# Patient Record
Sex: FEMALE | URBAN - METROPOLITAN AREA
[De-identification: names, ages, dates, MRNs, and addresses within clinical notes are randomized per-mention and may not be internally consistent; named-entity substitution may affect disease eponyms.]

---

## 2023-02-14 ENCOUNTER — APPOINTMENT (OUTPATIENT)
Dept: URBAN - METROPOLITAN AREA CLINIC 197 | Age: 63
Setting detail: DERMATOLOGY
End: 2023-02-16

## 2023-02-14 DIAGNOSIS — Z71.89 OTHER SPECIFIED COUNSELING: ICD-10-CM

## 2023-02-14 DIAGNOSIS — L98.8 OTHER SPECIFIED DISORDERS OF THE SKIN AND SUBCUTANEOUS TISSUE: ICD-10-CM

## 2023-02-14 DIAGNOSIS — L90.8 OTHER ATROPHIC DISORDERS OF SKIN: ICD-10-CM

## 2023-02-14 DIAGNOSIS — D18.0 HEMANGIOMA: ICD-10-CM

## 2023-02-14 DIAGNOSIS — D22 MELANOCYTIC NEVI: ICD-10-CM

## 2023-02-14 DIAGNOSIS — L57.0 ACTINIC KERATOSIS: ICD-10-CM

## 2023-02-14 DIAGNOSIS — R23.8 OTHER SKIN CHANGES: ICD-10-CM

## 2023-02-14 DIAGNOSIS — L82.1 OTHER SEBORRHEIC KERATOSIS: ICD-10-CM

## 2023-02-14 PROBLEM — D22.4 MELANOCYTIC NEVI OF SCALP AND NECK: Status: ACTIVE | Noted: 2023-02-14

## 2023-02-14 PROBLEM — D18.01 HEMANGIOMA OF SKIN AND SUBCUTANEOUS TISSUE: Status: ACTIVE | Noted: 2023-02-14

## 2023-02-14 PROCEDURE — OTHER COUNSELING: OTHER

## 2023-02-14 PROCEDURE — OTHER MIPS QUALITY: OTHER

## 2023-02-14 PROCEDURE — 17000 DESTRUCT PREMALG LESION: CPT

## 2023-02-14 PROCEDURE — OTHER LIQUID NITROGEN: OTHER

## 2023-02-14 PROCEDURE — 99213 OFFICE O/P EST LOW 20 MIN: CPT | Mod: 25

## 2023-02-14 PROCEDURE — OTHER OTHER: OTHER

## 2023-02-14 PROCEDURE — 17003 DESTRUCT PREMALG LES 2-14: CPT

## 2023-02-14 ASSESSMENT — LOCATION ZONE DERM
LOCATION ZONE: HAND
LOCATION ZONE: NOSE
LOCATION ZONE: FACE
LOCATION ZONE: NECK
LOCATION ZONE: TRUNK

## 2023-02-14 ASSESSMENT — LOCATION DETAILED DESCRIPTION DERM
LOCATION DETAILED: RIGHT RADIAL DORSAL HAND
LOCATION DETAILED: LEFT INFERIOR ANTERIOR NECK
LOCATION DETAILED: RIGHT INFERIOR NASAL CHEEK
LOCATION DETAILED: LEFT MEDIAL FOREHEAD
LOCATION DETAILED: LEFT INFERIOR MEDIAL FOREHEAD
LOCATION DETAILED: RIGHT MEDIAL FOREHEAD
LOCATION DETAILED: NASAL DORSUM
LOCATION DETAILED: LEFT LATERAL SUPERIOR CHEST
LOCATION DETAILED: LEFT LATERAL ABDOMEN
LOCATION DETAILED: NASAL ROOT
LOCATION DETAILED: LEFT RADIAL DORSAL HAND
LOCATION DETAILED: MIDDLE STERNUM

## 2023-02-14 ASSESSMENT — LOCATION SIMPLE DESCRIPTION DERM
LOCATION SIMPLE: CHEST
LOCATION SIMPLE: LEFT HAND
LOCATION SIMPLE: LEFT FOREHEAD
LOCATION SIMPLE: RIGHT CHEEK
LOCATION SIMPLE: NOSE
LOCATION SIMPLE: RIGHT FOREHEAD
LOCATION SIMPLE: RIGHT HAND
LOCATION SIMPLE: LEFT ANTERIOR NECK
LOCATION SIMPLE: ABDOMEN

## 2023-02-14 NOTE — PROCEDURE: MIPS QUALITY
Detail Level: Detailed
Quality 47: Advance Care Plan: Advance Care Planning discussed and documented; advance care plan or surrogate decision maker documented in the medical record.
Quality 431: Preventive Care And Screening: Unhealthy Alcohol Use - Screening: Patient not identified as an unhealthy alcohol user when screened for unhealthy alcohol use using a systematic screening method
Quality 226: Preventive Care And Screening: Tobacco Use: Screening And Cessation Intervention: Patient screened for tobacco use and is an ex/non-smoker
Quality 111:Pneumonia Vaccination Status For Older Adults: Pneumococcal vaccine (PPSV23) administered on or after patient’s 60th birthday and before the end of the measurement period

## 2023-02-14 NOTE — PROCEDURE: LIQUID NITROGEN
Show Applicator Variable?: Yes
Consent: The patient's consent was obtained including but not limited to risks of crusting, scabbing, blistering, scarring, darker or lighter pigmentary change, recurrence, incomplete removal and infection.
Render Note In Bullet Format When Appropriate: No
Detail Level: Detailed
Number Of Freeze-Thaw Cycles: 1 freeze-thaw cycle
Post-Care Instructions: I reviewed with the patient in detail post-care instructions. Patient is to wear sunprotection, and avoid picking at any of the treated lesions. Pt may apply Vaseline to crusted or scabbing areas.
Duration Of Freeze Thaw-Cycle (Seconds): 5

## 2023-02-14 NOTE — PROCEDURE: OTHER
Detail Level: Zone
Other (Free Text): After assessment, patient is a candidate for cosmetics treatment with Ollie Fonseca RN including neuromodulators, dermal fillers, chemical peels, dermaplaning, and/or manual extraction.
Render Risk Assessment In Note?: no
Note Text (......Xxx Chief Complaint.): This diagnosis correlates with the
Other (Free Text): After assessment, patient is a candidate for cosmetics treatment with Ollie Fonseca RN including neuromodulators, dermal fillers, chemical peels, dermaplaning, and/or manual extraction.
Other (Free Text): After assessment, patient is a candidate for cosmetics treatment with Ollie Fonseca RN including neuromodulators, dermal fillers, chemical peels, dermaplaning, and/or manual extraction.

## 2023-02-15 ENCOUNTER — APPOINTMENT (OUTPATIENT)
Dept: URBAN - METROPOLITAN AREA CLINIC 197 | Age: 63
Setting detail: DERMATOLOGY
End: 2023-02-16

## 2023-02-15 DIAGNOSIS — L98.8 OTHER SPECIFIED DISORDERS OF THE SKIN AND SUBCUTANEOUS TISSUE: ICD-10-CM

## 2023-02-15 DIAGNOSIS — L85.3 XEROSIS CUTIS: ICD-10-CM

## 2023-02-15 DIAGNOSIS — L90.8 OTHER ATROPHIC DISORDERS OF SKIN: ICD-10-CM

## 2023-02-15 PROCEDURE — OTHER JEUVEAU (U OR CC): OTHER

## 2023-02-15 PROCEDURE — OTHER RECOMMENDATIONS: OTHER

## 2023-02-15 PROCEDURE — OTHER COSMETIC CONSULTATION: FILLERS: OTHER

## 2023-02-15 PROCEDURE — OTHER COUNSELING: OTHER

## 2023-02-15 PROCEDURE — OTHER JEUVEAU (U OR CC) ADDITIVE: OTHER

## 2023-02-15 PROCEDURE — OTHER OTHER (COSMETIC): OTHER

## 2023-02-15 ASSESSMENT — LOCATION DETAILED DESCRIPTION DERM
LOCATION DETAILED: RIGHT FRONTALIS
LOCATION DETAILED: LEFT FRONTALIS
LOCATION DETAILED: LEFT CORRUGATOR SUPERCILIARIS
LOCATION DETAILED: LEFT MEDIAL INFERIOR EYELID
LOCATION DETAILED: RIGHT MEDIAL INFERIOR EYELID
LOCATION DETAILED: LEFT CENTRAL MANDIBULAR CHEEK
LOCATION DETAILED: RIGHT CORRUGATOR SUPERCILIARIS
LOCATION DETAILED: RIGHT MEDIAL MANDIBULAR CHEEK
LOCATION DETAILED: RIGHT CENTRAL MANDIBULAR CHEEK
LOCATION DETAILED: PROCERUS
LOCATION DETAILED: LEFT MEDIAL MANDIBULAR CHEEK

## 2023-02-15 ASSESSMENT — LOCATION SIMPLE DESCRIPTION DERM
LOCATION SIMPLE: RIGHT CHEEK
LOCATION SIMPLE: LEFT CORRUGATOR SUPERCILIARIS
LOCATION SIMPLE: LEFT FRONTALIS
LOCATION SIMPLE: RIGHT FRONTALIS
LOCATION SIMPLE: PROCERUS
LOCATION SIMPLE: RIGHT INFERIOR EYELID
LOCATION SIMPLE: LEFT CHEEK
LOCATION SIMPLE: LEFT INFERIOR EYELID
LOCATION SIMPLE: RIGHT CORRUGATOR SUPERCILIARIS

## 2023-02-15 ASSESSMENT — LOCATION ZONE DERM
LOCATION ZONE: FACE
LOCATION ZONE: EYELID

## 2023-05-11 ENCOUNTER — APPOINTMENT (OUTPATIENT)
Dept: URBAN - METROPOLITAN AREA CLINIC 197 | Age: 63
Setting detail: DERMATOLOGY
End: 2023-05-11

## 2023-05-11 DIAGNOSIS — L98.8 OTHER SPECIFIED DISORDERS OF THE SKIN AND SUBCUTANEOUS TISSUE: ICD-10-CM

## 2023-05-11 PROCEDURE — OTHER COUNSELING: OTHER

## 2023-05-11 PROCEDURE — OTHER JEUVEAU (U OR CC) ADDITIVE: OTHER

## 2023-05-11 PROCEDURE — OTHER JEUVEAU (U OR CC): OTHER

## 2023-05-11 ASSESSMENT — LOCATION DETAILED DESCRIPTION DERM
LOCATION DETAILED: LEFT CORRUGATOR SUPERCILIARIS
LOCATION DETAILED: RIGHT CORRUGATOR SUPERCILIARIS
LOCATION DETAILED: LEFT FRONTALIS
LOCATION DETAILED: RIGHT FRONTALIS
LOCATION DETAILED: PROCERUS

## 2023-05-11 ASSESSMENT — LOCATION ZONE DERM: LOCATION ZONE: FACE

## 2023-05-11 ASSESSMENT — LOCATION SIMPLE DESCRIPTION DERM
LOCATION SIMPLE: PROCERUS
LOCATION SIMPLE: LEFT FRONTALIS
LOCATION SIMPLE: RIGHT FRONTALIS
LOCATION SIMPLE: RIGHT CORRUGATOR SUPERCILIARIS
LOCATION SIMPLE: LEFT CORRUGATOR SUPERCILIARIS

## 2023-08-01 ENCOUNTER — APPOINTMENT (OUTPATIENT)
Dept: URBAN - METROPOLITAN AREA CLINIC 197 | Age: 63
Setting detail: DERMATOLOGY
End: 2023-08-08

## 2023-08-01 DIAGNOSIS — L85.3 XEROSIS CUTIS: ICD-10-CM

## 2023-08-01 DIAGNOSIS — L29.8 OTHER PRURITUS: ICD-10-CM

## 2023-08-01 DIAGNOSIS — L57.0 ACTINIC KERATOSIS: ICD-10-CM

## 2023-08-01 PROBLEM — K75.9 INFLAMMATORY LIVER DISEASE, UNSPECIFIED: Status: ACTIVE | Noted: 2023-08-01

## 2023-08-01 PROBLEM — E07.9 DISORDER OF THYROID, UNSPECIFIED: Status: ACTIVE | Noted: 2023-08-01

## 2023-08-01 PROBLEM — D51.9 VITAMIN B12 DEFICIENCY ANEMIA, UNSPECIFIED: Status: ACTIVE | Noted: 2023-08-01

## 2023-08-01 PROCEDURE — OTHER ORDER TESTS: OTHER

## 2023-08-01 PROCEDURE — OTHER COUNSELING: OTHER

## 2023-08-01 PROCEDURE — OTHER MIPS QUALITY: OTHER

## 2023-08-01 PROCEDURE — OTHER LIQUID NITROGEN: OTHER

## 2023-08-01 PROCEDURE — 99214 OFFICE O/P EST MOD 30 MIN: CPT | Mod: 25

## 2023-08-01 PROCEDURE — 17003 DESTRUCT PREMALG LES 2-14: CPT

## 2023-08-01 PROCEDURE — OTHER PRESCRIPTION MEDICATION MANAGEMENT: OTHER

## 2023-08-01 PROCEDURE — 17000 DESTRUCT PREMALG LESION: CPT

## 2023-08-01 PROCEDURE — OTHER MEDICATION COUNSELING: OTHER

## 2023-08-01 PROCEDURE — OTHER PRESCRIPTION: OTHER

## 2023-08-01 RX ORDER — TRIAMCINOLONE ACETONIDE 1 MG/G
CREAM TOPICAL BID
Qty: 453.6 | Refills: 0 | Status: ERX | COMMUNITY
Start: 2023-08-01

## 2023-08-01 ASSESSMENT — LOCATION DETAILED DESCRIPTION DERM
LOCATION DETAILED: RIGHT DISTAL POSTERIOR UPPER ARM
LOCATION DETAILED: RIGHT PROXIMAL DORSAL FOREARM
LOCATION DETAILED: LEFT PROXIMAL POSTERIOR UPPER ARM
LOCATION DETAILED: RIGHT PROXIMAL POSTERIOR UPPER ARM
LOCATION DETAILED: UPPER STERNUM
LOCATION DETAILED: LEFT PROXIMAL DORSAL FOREARM
LOCATION DETAILED: LEFT INFERIOR LATERAL FOREHEAD
LOCATION DETAILED: LEFT MID TEMPLE

## 2023-08-01 ASSESSMENT — LOCATION SIMPLE DESCRIPTION DERM
LOCATION SIMPLE: CHEST
LOCATION SIMPLE: LEFT FOREARM
LOCATION SIMPLE: LEFT POSTERIOR UPPER ARM
LOCATION SIMPLE: LEFT FOREHEAD
LOCATION SIMPLE: RIGHT POSTERIOR UPPER ARM
LOCATION SIMPLE: RIGHT FOREARM
LOCATION SIMPLE: LEFT TEMPLE

## 2023-08-01 ASSESSMENT — LOCATION ZONE DERM
LOCATION ZONE: ARM
LOCATION ZONE: TRUNK
LOCATION ZONE: FACE

## 2023-08-01 NOTE — PROCEDURE: MEDICATION COUNSELING
Spoke with Ms. Jose Hunter, informed her of Rx being sent to pharmacy. Cimzia Counseling:  I discussed with the patient the risks of Cimzia including but not limited to immunosuppression, allergic reactions and infections.  The patient understands that monitoring is required including a PPD at baseline and must alert us or the primary physician if symptoms of infection or other concerning signs are noted.

## 2023-08-01 NOTE — PROCEDURE: PRESCRIPTION MEDICATION MANAGEMENT
Render In Strict Bullet Format?: No
Initiate Treatment: Robathol bath oil daily
Detail Level: Zone
Initiate Treatment: Zyrtec 10mg QID prn itch\\nTMC 0.1% bid

## 2023-08-01 NOTE — PROCEDURE: MEDICATION COUNSELING
175.26 Odomzo Counseling- I discussed with the patient the risks of Odomzo including but not limited to nausea, vomiting, diarrhea, constipation, weight loss, changes in the sense of taste, decreased appetite, muscle spasms, and hair loss.  The patient verbalized understanding of the proper use and possible adverse effects of Odomzo.  All of the patient's questions and concerns were addressed.

## 2023-08-01 NOTE — PROCEDURE: LIQUID NITROGEN
Render Post-Care Instructions In Note?: no
Number Of Freeze-Thaw Cycles: 1 freeze-thaw cycle
Detail Level: Detailed
Show Applicator Variable?: Yes
Consent: The patient's consent was obtained including but not limited to risks of crusting, scabbing, blistering, scarring, darker or lighter pigmentary change, recurrence, incomplete removal and infection.
Post-Care Instructions: I reviewed with the patient in detail post-care instructions. Patient is to wear sunprotection, and avoid picking at any of the treated lesions. Pt may apply Vaseline to crusted or scabbing areas.
Duration Of Freeze Thaw-Cycle (Seconds): 5

## 2023-08-01 NOTE — HPI: OTHER
Condition:: Rash on neck  and arms x months
Please Describe Your Condition:: Tx calamine Benadryl A&D ointment
Condition:: Spots on face
Please Describe Your Condition:: X weeks tx none

## 2023-08-08 ENCOUNTER — APPOINTMENT (OUTPATIENT)
Dept: URBAN - METROPOLITAN AREA CLINIC 197 | Age: 63
Setting detail: DERMATOLOGY
End: 2023-08-08

## 2023-08-08 DIAGNOSIS — L85.3 XEROSIS CUTIS: ICD-10-CM

## 2023-08-08 DIAGNOSIS — L57.0 ACTINIC KERATOSIS: ICD-10-CM

## 2023-08-08 PROCEDURE — OTHER PHOTO-DOCUMENTATION: OTHER

## 2023-08-08 PROCEDURE — 99213 OFFICE O/P EST LOW 20 MIN: CPT | Mod: 24

## 2023-08-08 PROCEDURE — OTHER COUNSELING: OTHER

## 2023-08-08 PROCEDURE — OTHER PRESCRIPTION MEDICATION MANAGEMENT: OTHER

## 2023-08-08 PROCEDURE — OTHER DEFER: OTHER

## 2023-08-08 PROCEDURE — OTHER MIPS QUALITY: OTHER

## 2023-08-08 PROCEDURE — OTHER MEDICATION COUNSELING: OTHER

## 2023-08-08 PROCEDURE — OTHER LAB REPORTS REVIEWED: OTHER

## 2023-08-08 ASSESSMENT — LOCATION SIMPLE DESCRIPTION DERM
LOCATION SIMPLE: RIGHT FOREARM
LOCATION SIMPLE: LEFT FOREARM
LOCATION SIMPLE: CHEST
LOCATION SIMPLE: LEFT POSTERIOR UPPER ARM
LOCATION SIMPLE: RIGHT POSTERIOR UPPER ARM

## 2023-08-08 ASSESSMENT — LOCATION ZONE DERM
LOCATION ZONE: ARM
LOCATION ZONE: TRUNK

## 2023-08-08 ASSESSMENT — LOCATION DETAILED DESCRIPTION DERM
LOCATION DETAILED: LEFT PROXIMAL DORSAL FOREARM
LOCATION DETAILED: LEFT MEDIAL SUPERIOR CHEST
LOCATION DETAILED: RIGHT PROXIMAL DORSAL FOREARM
LOCATION DETAILED: RIGHT DISTAL POSTERIOR UPPER ARM
LOCATION DETAILED: LEFT PROXIMAL POSTERIOR UPPER ARM
LOCATION DETAILED: UPPER STERNUM

## 2023-08-08 NOTE — PROCEDURE: MEDICATION COUNSELING
Clindamycin Pregnancy And Lactation Text: This medication can be used in pregnancy if certain situations. Clindamycin is also present in breast milk. Niacinamide Counseling: I recommended taking niacin or niacinamide, also know as vitamin B3, twice daily. Recent evidence suggests that taking vitamin B3 (500 mg twice daily) can reduce the risk of actinic keratoses and non-melanoma skin cancers. Side effects of vitamin B3 include flushing and headache.

## 2023-08-08 NOTE — PROCEDURE: DEFER
Introduction Text (Please End With A Colon): The following procedure was deferred:
Size Of Lesion In Cm (Optional): 0
Instructions (Optional): Pt will return after her trip to have treated with cryo.
Detail Level: Detailed

## 2023-08-08 NOTE — PROCEDURE: MEDICATION COUNSELING
Patient states about 20 min. Ago he ate a chocolate chip cookie and it must have been touching a cookie with peanut butter or peanuts in it and he is allergic to peanuts.  Patient states he used someone's epi pen.  Patient states he is feeling better now.    Valtrex Counseling: I discussed with the patient the risks of valacyclovir including but not limited to kidney damage, nausea, vomiting and severe allergy.  The patient understands that if the infection seems to be worsening or is not improving, they are to call.

## 2023-08-08 NOTE — PROCEDURE: PRESCRIPTION MEDICATION MANAGEMENT
Continue Regimen: Zyrtec 10mg QID prn itch\\nTMC 0.1% bid prn
Render In Strict Bullet Format?: No
Detail Level: Zone

## 2023-08-08 NOTE — PROCEDURE: LAB REPORTS REVIEWED
Labs Reviewed Override: Pruritis work up
Detail Level: Zone
Summarized Lab Results: Creatinine elevated, sed rate elevated, white blood cells elevated, GFR low, calcium elevated. She follows closely with Dr. Anthony and Dr. Mora and they are f/u on her labs. \\nPt given a copy of her lab results

## 2023-08-14 ENCOUNTER — APPOINTMENT (OUTPATIENT)
Dept: URBAN - METROPOLITAN AREA CLINIC 197 | Age: 63
Setting detail: DERMATOLOGY
End: 2023-08-15

## 2023-08-14 ENCOUNTER — APPOINTMENT (OUTPATIENT)
Dept: URBAN - METROPOLITAN AREA CLINIC 197 | Age: 63
Setting detail: DERMATOLOGY
End: 2023-08-29

## 2023-08-14 DIAGNOSIS — Z41.9 ENCOUNTER FOR PROCEDURE FOR PURPOSES OTHER THAN REMEDYING HEALTH STATE, UNSPECIFIED: ICD-10-CM

## 2023-08-14 DIAGNOSIS — L57.0 ACTINIC KERATOSIS: ICD-10-CM

## 2023-08-14 PROCEDURE — OTHER JEUVEAU (U OR CC): OTHER

## 2023-08-14 PROCEDURE — 17003 DESTRUCT PREMALG LES 2-14: CPT

## 2023-08-14 PROCEDURE — OTHER COUNSELING: OTHER

## 2023-08-14 PROCEDURE — OTHER LIQUID NITROGEN: OTHER

## 2023-08-14 PROCEDURE — 17000 DESTRUCT PREMALG LESION: CPT

## 2023-08-14 PROCEDURE — OTHER OTHER (COSMETIC): OTHER

## 2023-08-14 ASSESSMENT — LOCATION SIMPLE DESCRIPTION DERM
LOCATION SIMPLE: CHEST
LOCATION SIMPLE: LEFT ANTERIOR NECK
LOCATION SIMPLE: RIGHT HAND

## 2023-08-14 ASSESSMENT — LOCATION ZONE DERM
LOCATION ZONE: NECK
LOCATION ZONE: TRUNK
LOCATION ZONE: HAND

## 2023-08-14 ASSESSMENT — LOCATION DETAILED DESCRIPTION DERM
LOCATION DETAILED: RIGHT RADIAL DORSAL HAND
LOCATION DETAILED: RIGHT DORSAL MIDDLE METACARPOPHALANGEAL JOINT
LOCATION DETAILED: RIGHT MEDIAL SUPERIOR CHEST
LOCATION DETAILED: LEFT MEDIAL SUPERIOR CHEST
LOCATION DETAILED: LEFT INFERIOR LATERAL NECK

## 2023-08-14 NOTE — PROCEDURE: JEUVEAU (U OR CC)
Post-Care Instructions: -Avoid touching or physical pressure on the treated area.\\n-Expect swelling and bruising. Apply a pack of ice for relief using light pressure only.\\n-For the first four hours, do not lay down.\\n-Do not drink alcohol or use NSAIDs for 24 hours following your treatment to reduce risk of additional bruising.\\n-Avoid sun exposure, tanning beds, saunas, and hot tubs for 24 hours.\\n-Avoid workouts or any activities raising the blood pressure or hard rate for the first 24 hours.\\n- Neuromodulators can take 3-5 days to take affect and up to 2 weeks to be at its \"final\" stage. RD the 14th day. Encouraged to schedule two week appointment for any refinements.\\n\\nContact the office for concerns. Reviewed for emergencies, go to your local emergency department.\\n\\nPaper post care instructions were given to patient prior to leaving office.\\n\\nPatient tolerated procedure well with no immediate adverse effects. Patient left the office AOx3.\\n\\nEncouraged to contact the office c any questions. [Negative] : Endocrine [TextBox_30] : HPI [TextBox_44] : [place]STON, MINNA with PTCI stent [TextBox_69] : Right inguinal Hernia [TextBox_110] : PE/ DVT

## 2023-08-14 NOTE — PROCEDURE: JEUVEAU (U OR CC)
Price (Use Numbers Only, No Special Characters Or $): 630 Price (Use Numbers Only, No Special Characters Or $): 663

## 2023-08-14 NOTE — PROCEDURE: OTHER (COSMETIC)
Detail Level: Zone
Other (Free Text): She states that she hates the procedure crease. Will add2\\n 2 units here to try and immobilize and allow static rhytid healing and lightening

## 2024-03-13 ENCOUNTER — APPOINTMENT (OUTPATIENT)
Dept: URBAN - METROPOLITAN AREA CLINIC 197 | Age: 64
Setting detail: DERMATOLOGY
End: 2024-03-14

## 2024-03-13 ENCOUNTER — APPOINTMENT (OUTPATIENT)
Dept: URBAN - METROPOLITAN AREA CLINIC 197 | Age: 64
Setting detail: DERMATOLOGY
End: 2024-04-02

## 2024-03-13 DIAGNOSIS — D485 NEOPLASM OF UNCERTAIN BEHAVIOR OF SKIN: ICD-10-CM

## 2024-03-13 DIAGNOSIS — L57.0 ACTINIC KERATOSIS: ICD-10-CM

## 2024-03-13 DIAGNOSIS — Z41.9 ENCOUNTER FOR PROCEDURE FOR PURPOSES OTHER THAN REMEDYING HEALTH STATE, UNSPECIFIED: ICD-10-CM

## 2024-03-13 PROBLEM — D48.5 NEOPLASM OF UNCERTAIN BEHAVIOR OF SKIN: Status: ACTIVE | Noted: 2024-03-13

## 2024-03-13 PROCEDURE — 11102 TANGNTL BX SKIN SINGLE LES: CPT

## 2024-03-13 PROCEDURE — OTHER COUNSELING: OTHER

## 2024-03-13 PROCEDURE — OTHER LIQUID NITROGEN: OTHER

## 2024-03-13 PROCEDURE — OTHER JEUVEAU (U OR CC): OTHER

## 2024-03-13 PROCEDURE — 17000 DESTRUCT PREMALG LESION: CPT | Mod: 59

## 2024-03-13 PROCEDURE — 17003 DESTRUCT PREMALG LES 2-14: CPT

## 2024-03-13 PROCEDURE — OTHER DEFER: OTHER

## 2024-03-13 PROCEDURE — OTHER MIPS QUALITY: OTHER

## 2024-03-13 PROCEDURE — OTHER OTHER (COSMETIC): OTHER

## 2024-03-13 PROCEDURE — OTHER BIOPSY BY SHAVE METHOD: OTHER

## 2024-03-13 ASSESSMENT — LOCATION SIMPLE DESCRIPTION DERM
LOCATION SIMPLE: CHEST
LOCATION SIMPLE: RIGHT CHEEK
LOCATION SIMPLE: RIGHT HAND
LOCATION SIMPLE: RIGHT WRIST
LOCATION SIMPLE: RIGHT FOREARM
LOCATION SIMPLE: LEFT FOREHEAD
LOCATION SIMPLE: LEFT EYEBROW

## 2024-03-13 ASSESSMENT — LOCATION ZONE DERM
LOCATION ZONE: ARM
LOCATION ZONE: FACE
LOCATION ZONE: HAND
LOCATION ZONE: TRUNK

## 2024-03-13 ASSESSMENT — LOCATION DETAILED DESCRIPTION DERM
LOCATION DETAILED: RIGHT RADIAL DORSAL HAND
LOCATION DETAILED: RIGHT DISTAL DORSAL FOREARM
LOCATION DETAILED: RIGHT LATERAL MALAR CHEEK
LOCATION DETAILED: RIGHT ULNAR DORSAL HAND
LOCATION DETAILED: MIDDLE STERNUM
LOCATION DETAILED: LEFT MEDIAL SUPERIOR CHEST
LOCATION DETAILED: LEFT LATERAL EYEBROW
LOCATION DETAILED: LEFT INFERIOR FOREHEAD
LOCATION DETAILED: RIGHT LATERAL DORSAL WRIST

## 2024-03-13 NOTE — PROCEDURE: COUNSELING
Miralax daily and again at night if needed.    Please schedule for egd  barretts  
Detail Level: Zone
Detail Level: Simple

## 2024-03-13 NOTE — PROCEDURE: JEUVEAU (U OR CC)
Additional Area 4 Units: 0
Price (Use Numbers Only, No Special Characters Or $): 091
Post-Care Instructions: -Avoid touching or physical pressure on the treated area.\\n-Expect swelling and bruising. Apply a pack of ice for relief using light pressure only.\\n-For the first four hours, do not lay down.\\n-Do not drink alcohol or use NSAIDs for 24 hours following your treatment to reduce risk of additional bruising.\\n-Avoid sun exposure, tanning beds, saunas, and hot tubs for 24 hours.\\n-Avoid workouts or any activities raising the blood pressure or hard rate for the first 24 hours.\\n- Neuromodulators can take 3-5 days to take affect and up to 2 weeks to be at its \"final\" stage. RD the 14th day. Encouraged to schedule two week appointment for any refinements.\\n\\nContact the office for concerns. Reviewed for emergencies, go to your local emergency department.\\n\\nPaper post care instructions were given to patient prior to leaving office.\\n\\nPatient tolerated procedure well with no immediate adverse effects. Patient left the office AOx3.\\n\\nEncouraged to contact the office c any questions.
Document As Units Or Cc?: units
Detail Level: Detailed
Lot #: [id number]
Expiration Date (Month Year): 11/2025
Glabellar Complex Units: 20
Consent: Written consent obtained. Risks include but not limited hypersensitivity reaction, lid/brow ptosis, bruising, swelling, diplopia, temporary effect, incomplete chemical denervation.\\n\\nBotulinum toxin and neuromodulator/neurotoxin treatment were discussed in detail with the patient. Including risks, benefits, expected outcomes, MOA, and overall patient goals. Pt verbalized understanding throughout the discussion and procedure and was encouraged to ask any questions.\\n\\n-\\nProcedure Details:\\n\\nPlease see attached map for locations, injection amounts, and treatment notes/memorandums.\\n\\nArea was prepared with 70% isopropyl alcohol and hypochlorous acid (Puracyn). \\n\\nScanned medical history, medication reconciliation, know allergies, and past treatments (if applicable) as well as photo consent were reviewed with patient prior to procedure.\\n\\nPt advised that should photo consent for marketing/publication be declined, photos will still be obtained a securely stored in their EHR records.
Including Pricing Information In The Note: No
Dilution (U/0.1 Cc): 4
Forehead Units/Cc: 10

## 2024-03-13 NOTE — PROCEDURE: DEFER
Detail Level: Zone
Instructions (Optional): Pt does not wish to have these areas tx at this time
Size Of Lesion In Cm (Optional): 0
Introduction Text (Please End With A Colon): The following procedure was deferred:

## 2024-03-13 NOTE — PROCEDURE: BIOPSY BY SHAVE METHOD
Detail Level: Detailed
Depth Of Biopsy: dermis
Was A Bandage Applied: Yes
Size Of Lesion In Cm: 0
Biopsy Type: H and E
Biopsy Method: 10 blade
Anesthesia Type: 1% lidocaine with epinephrine
Anesthesia Volume In Cc: 0.5
Hemostasis: Electrocautery
Wound Care: No ointment
Dressing: Band-Aid
Destruction After The Procedure: No
Type Of Destruction Used: Curettage
Curettage Text: The wound bed was treated with curettage after the biopsy was performed.
Cryotherapy Text: The wound bed was treated with cryotherapy after the biopsy was performed.
Electrodesiccation Text: The wound bed was treated with electrodesiccation after the biopsy was performed.
Electrodesiccation And Curettage Text: The wound bed was treated with electrodesiccation and curettage after the biopsy was performed.
Silver Nitrate Text: The wound bed was treated with silver nitrate after the biopsy was performed.
Lab: -[id number]
Consent: Verbal consent was obtained and risks were reviewed including but not limited to scarring, infection, bleeding, scabbing, incomplete removal, nerve damage and allergy to anesthesia.
Post-Care Instructions: I reviewed with the patient in detail post-care instructions. Patient is to keep the biopsy site dry overnight, and then apply vaseline if needed daily until healed.
Notification Instructions: Patient will be notified of biopsy results. However, patient instructed to call the office if not contacted within 2 weeks.
Billing Type: Third-Party Bill
Information: Selecting Yes will display possible errors in your note based on the variables you have selected. This validation is only offered as a suggestion for you. PLEASE NOTE THAT THE VALIDATION TEXT WILL BE REMOVED WHEN YOU FINALIZE YOUR NOTE. IF YOU WANT TO FAX A PRELIMINARY NOTE YOU WILL NEED TO TOGGLE THIS TO 'NO' IF YOU DO NOT WANT IT IN YOUR FAXED NOTE.

## 2024-03-13 NOTE — PROCEDURE: LIQUID NITROGEN
Number Of Freeze-Thaw Cycles: 1 freeze-thaw cycle
Render Post-Care Instructions In Note?: no
Consent: The patient's consent was obtained including but not limited to risks of crusting, scabbing, blistering, scarring, darker or lighter pigmentary change, recurrence, incomplete removal and infection.
Show Aperture Variable?: Yes
Detail Level: Zone
Post-Care Instructions: I reviewed with the patient in detail post-care instructions. Patient is to wear sunprotection, and avoid picking at any of the treated lesions. Pt may apply Vaseline to crusted or scabbing areas.
Duration Of Freeze Thaw-Cycle (Seconds): 5

## 2024-04-17 ENCOUNTER — APPOINTMENT (OUTPATIENT)
Dept: URBAN - METROPOLITAN AREA CLINIC 197 | Age: 64
Setting detail: DERMATOLOGY
End: 2024-04-18

## 2024-04-17 DIAGNOSIS — D69.2 OTHER NONTHROMBOCYTOPENIC PURPURA: ICD-10-CM

## 2024-04-17 PROBLEM — D04.61 CARCINOMA IN SITU OF SKIN OF RIGHT UPPER LIMB, INCLUDING SHOULDER: Status: ACTIVE | Noted: 2024-04-17

## 2024-04-17 PROCEDURE — 17271 DSTR MAL LES S/N/H/F/G 0.6-1: CPT

## 2024-04-17 PROCEDURE — OTHER REASSURANCE: OTHER

## 2024-04-17 PROCEDURE — OTHER COUNSELING: OTHER

## 2024-04-17 PROCEDURE — OTHER PATHOLOGY DISCUSSION: OTHER

## 2024-04-17 PROCEDURE — OTHER MIPS QUALITY: OTHER

## 2024-04-17 PROCEDURE — OTHER CURETTAGE AND DESTRUCTION: OTHER

## 2024-04-17 PROCEDURE — 99212 OFFICE O/P EST SF 10 MIN: CPT | Mod: 25

## 2024-04-17 ASSESSMENT — LOCATION ZONE DERM: LOCATION ZONE: ARM

## 2024-04-17 ASSESSMENT — LOCATION SIMPLE DESCRIPTION DERM: LOCATION SIMPLE: LEFT FOREARM

## 2024-04-17 ASSESSMENT — LOCATION DETAILED DESCRIPTION DERM: LOCATION DETAILED: LEFT DISTAL DORSAL FOREARM

## 2024-04-17 NOTE — PROCEDURE: CURETTAGE AND DESTRUCTION

## 2024-08-07 ENCOUNTER — APPOINTMENT (OUTPATIENT)
Dept: URBAN - METROPOLITAN AREA CLINIC 197 | Age: 64
Setting detail: DERMATOLOGY
End: 2024-08-07

## 2024-08-07 DIAGNOSIS — Z41.9 ENCOUNTER FOR PROCEDURE FOR PURPOSES OTHER THAN REMEDYING HEALTH STATE, UNSPECIFIED: ICD-10-CM

## 2024-08-07 PROCEDURE — OTHER JEUVEAU (U OR CC): OTHER

## 2024-08-07 PROCEDURE — OTHER OTHER (COSMETIC): OTHER

## 2024-08-07 PROCEDURE — OTHER COUNSELING: OTHER

## 2024-08-07 NOTE — PROCEDURE: JEUVEAU (U OR CC)
Glabellar Complex Units: 6
Dilution (U/0.1 Cc): 4
Levator Labii Superioris Units: 0
Additional Area 1 Location: corrugators
Consent: Written consent obtained. Risks include but not limited hypersensitivity reaction, lid/brow ptosis, bruising, swelling, diplopia, temporary effect, incomplete chemical denervation.\\n\\nBotulinum toxin and neuromodulator/neurotoxin treatment were discussed in detail with the patient. Including risks, benefits, expected outcomes, MOA, and overall patient goals. Pt verbalized understanding throughout the discussion and procedure and was encouraged to ask any questions.\\n\\n-\\nProcedure Details:\\n\\nPlease see attached map for locations, injection amounts, and treatment notes/memorandums.\\n\\nArea was prepared with 70% isopropyl alcohol and hypochlorous acid (Puracyn). \\n\\nScanned medical history, medication reconciliation, know allergies, and past treatments (if applicable) as well as photo consent were reviewed with patient prior to procedure.\\n\\nPt advised that should photo consent for marketing/publication be declined, photos will still be obtained a securely stored in their EHR records.
Including Pricing Information In The Note: No
Post-Care Instructions: -Avoid touching or physical pressure on the treated area.\\n-Expect swelling and bruising. Apply a pack of ice for relief using light pressure only.\\n-For the first four hours, do not lay down.\\n-Do not drink alcohol or use NSAIDs for 24 hours following your treatment to reduce risk of additional bruising.\\n-Avoid sun exposure, tanning beds, saunas, and hot tubs for 24 hours.\\n-Avoid workouts or any activities raising the blood pressure or hard rate for the first 24 hours.\\n- Neuromodulators can take 3-5 days to take affect and up to 2 weeks to be at its \"final\" stage. RD the 14th day. Encouraged to schedule two week appointment for any refinements.\\n\\nContact the office for concerns. Reviewed for emergencies, go to your local emergency department.\\n\\nPaper post care instructions were given to patient prior to leaving office.\\n\\nPatient tolerated procedure well with no immediate adverse effects. Patient left the office AOx3.\\n\\nEncouraged to contact the office c any questions.
Detail Level: Detailed
Expiration Date (Month Year): 09/2025
Forehead Units/Cc: 10
Price (Use Numbers Only, No Special Characters Or $): 320
Additional Area 1 Units: 14
Document As Units Or Cc?: units
Lot #: [id number]

## 2024-12-11 ENCOUNTER — APPOINTMENT (OUTPATIENT)
Dept: URBAN - METROPOLITAN AREA CLINIC 197 | Age: 64
Setting detail: DERMATOLOGY
End: 2024-12-13

## 2024-12-11 ENCOUNTER — APPOINTMENT (OUTPATIENT)
Dept: URBAN - METROPOLITAN AREA CLINIC 197 | Age: 64
Setting detail: DERMATOLOGY
End: 2024-12-17

## 2024-12-11 DIAGNOSIS — I78.8 OTHER DISEASES OF CAPILLARIES: ICD-10-CM

## 2024-12-11 DIAGNOSIS — L57.0 ACTINIC KERATOSIS: ICD-10-CM

## 2024-12-11 DIAGNOSIS — Z41.9 ENCOUNTER FOR PROCEDURE FOR PURPOSES OTHER THAN REMEDYING HEALTH STATE, UNSPECIFIED: ICD-10-CM

## 2024-12-11 DIAGNOSIS — L98.8 OTHER SPECIFIED DISORDERS OF THE SKIN AND SUBCUTANEOUS TISSUE: ICD-10-CM

## 2024-12-11 DIAGNOSIS — D485 NEOPLASM OF UNCERTAIN BEHAVIOR OF SKIN: ICD-10-CM

## 2024-12-11 PROBLEM — D48.5 NEOPLASM OF UNCERTAIN BEHAVIOR OF SKIN: Status: ACTIVE | Noted: 2024-12-11

## 2024-12-11 PROCEDURE — 17003 DESTRUCT PREMALG LES 2-14: CPT

## 2024-12-11 PROCEDURE — OTHER JEUVEAU (U OR CC): OTHER

## 2024-12-11 PROCEDURE — OTHER OTHER (COSMETIC): OTHER

## 2024-12-11 PROCEDURE — 11102 TANGNTL BX SKIN SINGLE LES: CPT

## 2024-12-11 PROCEDURE — 17000 DESTRUCT PREMALG LESION: CPT | Mod: 59

## 2024-12-11 PROCEDURE — 99212 OFFICE O/P EST SF 10 MIN: CPT | Mod: 25

## 2024-12-11 PROCEDURE — OTHER LIQUID NITROGEN: OTHER

## 2024-12-11 PROCEDURE — OTHER OTHER: OTHER

## 2024-12-11 PROCEDURE — OTHER MIPS QUALITY: OTHER

## 2024-12-11 PROCEDURE — OTHER COUNSELING: OTHER

## 2024-12-11 PROCEDURE — OTHER BIOPSY BY SHAVE METHOD: OTHER

## 2024-12-11 PROCEDURE — OTHER DEFER: OTHER

## 2024-12-11 ASSESSMENT — LOCATION SIMPLE DESCRIPTION DERM
LOCATION SIMPLE: RIGHT TEMPLE
LOCATION SIMPLE: LEFT TEMPLE
LOCATION SIMPLE: LEFT CHEEK
LOCATION SIMPLE: RIGHT CHEEK
LOCATION SIMPLE: RIGHT ZYGOMA

## 2024-12-11 ASSESSMENT — LOCATION DETAILED DESCRIPTION DERM
LOCATION DETAILED: RIGHT INFERIOR CENTRAL MALAR CHEEK
LOCATION DETAILED: RIGHT CENTRAL ZYGOMA
LOCATION DETAILED: LEFT INFERIOR CENTRAL MALAR CHEEK
LOCATION DETAILED: RIGHT CENTRAL TEMPLE
LOCATION DETAILED: LEFT CENTRAL TEMPLE

## 2024-12-11 ASSESSMENT — LOCATION ZONE DERM: LOCATION ZONE: FACE

## 2024-12-11 NOTE — PROCEDURE: LIQUID NITROGEN
Duration Of Freeze Thaw-Cycle (Seconds): 5
Consent: The patient's consent was obtained including but not limited to risks of crusting, scabbing, blistering, scarring, darker or lighter pigmentary change, recurrence, incomplete removal and infection.
Post-Care Instructions: I reviewed with the patient in detail post-care instructions. Patient is to wear sunprotection, and avoid picking at any of the treated lesions. Pt may apply Vaseline to crusted or scabbing areas.
Render Post-Care Instructions In Note?: no
Number Of Freeze-Thaw Cycles: 1 freeze-thaw cycle
Detail Level: Simple
Show Applicator Variable?: Yes

## 2024-12-11 NOTE — PROCEDURE: OTHER
Render Risk Assessment In Note?: no
Other (Free Text): Pt seeing Ollie today, she will ask for laser recommendations. Likely Dr. Back vs Archie Childers
Note Text (......Xxx Chief Complaint.): This diagnosis correlates with the
Detail Level: Zone

## 2024-12-11 NOTE — HPI: OTHER
Condition:: Spots right and left temple, also orbital area x a couple months.  Broken blood vessels on cheeks
Please Describe Your Condition:: No treatment

## 2024-12-11 NOTE — PROCEDURE: JEUVEAU (U OR CC)
Dilution (U/0.1 Cc): 4
Additional Area 3 Units: 0
Detail Level: Detailed
Post-Care Instructions: -Avoid touching or physical pressure on the treated area.\\n-Expect swelling and bruising. Apply a pack of ice for relief using light pressure only.\\n-For the first four hours, do not lay down.\\n-Do not drink alcohol or use NSAIDs for 24 hours following your treatment to reduce risk of additional bruising.\\n-Avoid sun exposure, tanning beds, saunas, and hot tubs for 24 hours.\\n-Avoid workouts or any activities raising the blood pressure or hard rate for the first 24 hours.\\n- Neuromodulators can take 3-5 days to take affect and up to 2 weeks to be at its \"final\" stage. RD the 14th day. Encouraged to schedule two week appointment for any refinements.\\n\\nContact the office for concerns. Reviewed for emergencies, go to your local emergency department.\\n\\nPaper post care instructions were given to patient prior to leaving office.\\n\\nPatient tolerated procedure well with no immediate adverse effects. Patient left the office AOx3.\\n\\nEncouraged to contact the office c any questions.
Price (Use Numbers Only, No Special Characters Or $): 320
Glabellar Complex Units: 20
Consent: Written consent obtained. Risks include but not limited hypersensitivity reaction, lid/brow ptosis, bruising, swelling, diplopia, temporary effect, incomplete chemical denervation.\\n\\nBotulinum toxin and neuromodulator/neurotoxin treatment were discussed in detail with the patient. Including risks, benefits, expected outcomes, MOA, and overall patient goals. Pt verbalized understanding throughout the discussion and procedure and was encouraged to ask any questions.\\n\\n-\\nProcedure Details:\\n\\nPlease see attached map for locations, injection amounts, and treatment notes/memorandums.\\n\\nArea was prepared with 70% isopropyl alcohol and hypochlorous acid (Puracyn). \\n\\nScanned medical history, medication reconciliation, know allergies, and past treatments (if applicable) as well as photo consent were reviewed with patient prior to procedure.\\n\\nPt advised that should photo consent for marketing/publication be declined, photos will still be obtained a securely stored in their EHR records.
Forehead Units/Cc: 10
Including Pricing Information In The Note: No
Expiration Date (Month Year): 09/2025
Lot #: [id number]
Document As Units Or Cc?: units

## 2024-12-11 NOTE — PROCEDURE: BIOPSY BY SHAVE METHOD
Detail Level: Detailed
Depth Of Biopsy: dermis
Was A Bandage Applied: Yes
Size Of Lesion In Cm: 0
Biopsy Type: H and E
Biopsy Method: 10 blade
Anesthesia Type: 1% lidocaine with epinephrine
Anesthesia Volume In Cc: 0.5
Hemostasis: Electrocautery
Wound Care: No ointment
Dressing: Band-Aid
Destruction After The Procedure: No
Type Of Destruction Used: Curettage
Curettage Text: The wound bed was treated with curettage after the biopsy was performed.
Cryotherapy Text: The wound bed was treated with cryotherapy after the biopsy was performed.
Electrodesiccation Text: The wound bed was treated with electrodesiccation after the biopsy was performed.
Electrodesiccation And Curettage Text: The wound bed was treated with electrodesiccation and curettage after the biopsy was performed.
Silver Nitrate Text: The wound bed was treated with silver nitrate after the biopsy was performed.
Lab: -[id number]
Path Notes (To The Dermatopathologist): Small specimen
Medical Necessity Information: It is in your best interest to select a reason for this procedure from the list below. All of these items fulfill various CMS LCD requirements except the new and changing color options.
Consent: Verbal consent was obtained and risks were reviewed including but not limited to scarring, infection, bleeding, scabbing, incomplete removal, nerve damage and allergy to anesthesia.
Post-Care Instructions: I reviewed with the patient in detail post-care instructions. Patient is to keep the biopsy site dry overnight, and then apply vaseline if needed daily until healed.
Notification Instructions: Patient will be notified of biopsy results. However, patient instructed to call the office if not contacted within 2 weeks.
Billing Type: Third-Party Bill
Information: Selecting Yes will display possible errors in your note based on the variables you have selected. This validation is only offered as a suggestion for you. PLEASE NOTE THAT THE VALIDATION TEXT WILL BE REMOVED WHEN YOU FINALIZE YOUR NOTE. IF YOU WANT TO FAX A PRELIMINARY NOTE YOU WILL NEED TO TOGGLE THIS TO 'NO' IF YOU DO NOT WANT IT IN YOUR FAXED NOTE.

## 2024-12-11 NOTE — PROCEDURE: DEFER
X Size Of Lesion In Cm (Optional): 0
Procedure To Be Performed At Next Visit: Electrodesiccation
Detail Level: Zone
Introduction Text (Please End With A Colon): The following procedure was deferred: